# Patient Record
Sex: MALE | Race: WHITE | NOT HISPANIC OR LATINO | Employment: OTHER | URBAN - METROPOLITAN AREA
[De-identification: names, ages, dates, MRNs, and addresses within clinical notes are randomized per-mention and may not be internally consistent; named-entity substitution may affect disease eponyms.]

---

## 2019-10-18 ENCOUNTER — OFFICE VISIT (OUTPATIENT)
Dept: GASTROENTEROLOGY | Facility: CLINIC | Age: 49
End: 2019-10-18

## 2019-10-18 VITALS — BODY MASS INDEX: 28.01 KG/M2 | HEIGHT: 76 IN | WEIGHT: 230 LBS

## 2019-10-18 DIAGNOSIS — Z12.11 SCREENING FOR COLON CANCER: Primary | ICD-10-CM

## 2019-10-21 ENCOUNTER — TELEPHONE (OUTPATIENT)
Dept: GASTROENTEROLOGY | Facility: CLINIC | Age: 49
End: 2019-10-21

## 2019-10-21 NOTE — TELEPHONE ENCOUNTER
Spoke to pt; he asked if screening colon is necessary since he is not experiencing any sx that were asked at prep visit  I explained screening at age 47 is recommended; one does not want to wait for any sx  Pt understands  He will keep appt for 11/20 now and decide if he wants to proceed

## 2019-11-04 NOTE — TELEPHONE ENCOUNTER
Pt called and cancelled colon-did not want to reschedule, felt as he was not having any problems he didn't need procedure  Pt will call back if he decides to reschedule

## 2019-11-13 NOTE — TELEPHONE ENCOUNTER
Spoke to the pt and told him the reason that a colonoscopy should be done  He will think about it and let us know done the road  Please put him on 6 months recall so we can readdress the colonoscopy